# Patient Record
Sex: MALE | Race: WHITE
[De-identification: names, ages, dates, MRNs, and addresses within clinical notes are randomized per-mention and may not be internally consistent; named-entity substitution may affect disease eponyms.]

---

## 2017-07-06 ENCOUNTER — HOSPITAL ENCOUNTER (EMERGENCY)
Dept: HOSPITAL 8 - ED | Age: 57
LOS: 1 days | Discharge: HOME | End: 2017-07-07
Payer: SELF-PAY

## 2017-07-06 VITALS — WEIGHT: 149.25 LBS | BODY MASS INDEX: 21.37 KG/M2 | HEIGHT: 70 IN

## 2017-07-06 VITALS — DIASTOLIC BLOOD PRESSURE: 86 MMHG | SYSTOLIC BLOOD PRESSURE: 154 MMHG

## 2017-07-06 DIAGNOSIS — L02.413: ICD-10-CM

## 2017-07-06 DIAGNOSIS — L03.113: Primary | ICD-10-CM

## 2017-07-06 PROCEDURE — 10060 I&D ABSCESS SIMPLE/SINGLE: CPT

## 2021-07-06 ENCOUNTER — HOSPITAL ENCOUNTER (EMERGENCY)
Dept: HOSPITAL 8 - ED | Age: 61
Discharge: HOME | End: 2021-07-06
Payer: COMMERCIAL

## 2021-07-06 VITALS — BODY MASS INDEX: 22.54 KG/M2 | WEIGHT: 157.41 LBS | HEIGHT: 70 IN

## 2021-07-06 VITALS — SYSTOLIC BLOOD PRESSURE: 163 MMHG | DIASTOLIC BLOOD PRESSURE: 98 MMHG

## 2021-07-06 DIAGNOSIS — K40.90: Primary | ICD-10-CM

## 2021-07-06 PROCEDURE — 99281 EMR DPT VST MAYX REQ PHY/QHP: CPT

## 2021-07-06 NOTE — NUR
bedside report from jolie rn, pt care transferred at this time. pt in nad. 
Patient is resting comfortably in bed. Bed in lowest, rails engaged, call light 
on lap. Awaiting orders. WCTM.

## 2021-07-06 NOTE — NUR
"I HAVE A HERNIA ON MY R GROIN FOR THE PAST TWO YEARS, BUT ITS GOTTEN SUPER 
PAINFUL IN THE LAST WEEK."

Able to reduce manually.

no bowel or bladder difficulties